# Patient Record
(demographics unavailable — no encounter records)

---

## 2024-10-22 NOTE — HISTORY OF PRESENT ILLNESS
[FreeTextEntry1] :  ID#: [id number]   The patient is a 35 y/o female with no significant PMHx, presenting for hospital follow-up following a 5-day admission to [facility] last week for left-sided facial paralysis. The patient states that last Thursday, 10/17, she began to feel the left side of her face get numb. It became progressively worse, involving the entire left side of the face and mouth, prompting her presentation to the hospital. Ever since discharge, she has begun to experience right-sided blurred vision and tearing of the right eye, with nearly full resolution of the left-sided symptoms. This is the first time anything like this has ever happened, and no on in her family has similar issues. Brain imaging at [facility] was "normal", per the patient. The patient denies LOC, but endorses associated headache. She also denies weakness/numbness of any other part of the body. She notes that right now the only symptom she has is right sided "swelling" sensation and a mild weakness of the left side when opening the mouth. She endorses having the flu a week before the paralysis onset. She was previously healthy with no chronic medical issues and no medications. She is currently on a steroid taper (started at 20mg prednisone), as well as aspirin 81mg and Atorvastatin 40mg, all started with this admission. She denies any h/o head trauma.

## 2024-10-22 NOTE — END OF VISIT
[] : Resident [FreeTextEntry3] : 34-year-old woman presenting with right hemifacial weakness and disturbance of taste suggestive of facial nerve involvement.  She received high-dose prednisone taper with clinical improvement.  Will obtain MRI brain with and without gadolinium with cranial nerve protocol including thin sections through the brainstem.  Obtain labs including CBC comp coags, rheumatologic screening, B12 TSH hepatitis B and C panels, ESR CRP, VZV serology, Lyme, HIV

## 2024-10-22 NOTE — ASSESSMENT
[FreeTextEntry1] : The patient is a 35 y/o F with no significant PMHx who presents for follow-up after hospitalization for left-sided facial paresis and subsequent Bell's palsy diagnosis. The patient endorses near full resolution of symptoms following administration of steroids, making Bell's palsy high on the differential. However, without access to imaging, cannot r/o other acute processes or demyelinating disorders. In a young female with no significant medical history, can also consider MS, given the ocular involvement, however, this is less likely with the autonomic symptoms described.  Plan: - MRI brain w/w/out contrast - IAC and CN protocol w/ thin slices through brain stem - MRA w/ contrast - Autoimmune and Rheumatologic labs - Basic infectious labs - CBC and CMP  Case discussed with Dr. Franz

## 2024-10-22 NOTE — PHYSICAL EXAM
[FreeTextEntry1] :   Mental status: Awake, alert and oriented x3. Naming, repetition and comprehension intact.  Attention/concentration intact.  No dysarthria, no aphasia..   Cranial nerves: Pupils equally round and reactive to light, visual fields full, no nystagmus, extraocular muscles intact, V1 through V3 intact bilaterally and symmetric, hearing intact to finger rub, palate elevation symmetric, tongue was midline. Mild right-sided facial paresis noted, particularly upon smiling.   Motor:  MRC grading 5/5 b/l UE/LE.   strength 5/5.  Normal tone and bulk.  No abnormal movements.   Sensation: Intact to light touch, proprioception, and pinprick.   Coordination: No dysmetria on finger-to-nose and heel-to-shin.  No dysdiadochokinesia.   Reflexes: 2+ in bilateral UE/LE, down-going toes bilaterally. (-) Denny.   Gait: Narrow and steady. No ataxia.  Romberg negative

## 2025-05-29 NOTE — HISTORY OF PRESENT ILLNESS
[FreeTextEntry1] : 35yo , LMP 4/15, hx of bell's palsy, possible bl salpingectomy?, presents to [facility] for annual. Pt reports she has painful periods x4-5 months but does not require OTC analgesics. Denies dysuria, breast pain, abnormal discharge. Pt reports leakage of urine when coughing, jumping. Denies urinary incontinence related to urgency. Reports intercourse w/ 1 male partner of 3 years. Requests STI testing today. Reports she received 2/3 Gardasil doses at another facility approx. 5 years ago and did not return for f/u.  OB Hx: FT c/s x2 (breech) FT  (P2)  Gyn hx: irregular, 2-3 days, light bleeding. denies hx of fibroids, polyps, ovarian cysts. Hx of chlamydia (tx), abnormal pap HPV +, colpo w/ possible LEEP vs cold knife Surg Hx: tubal ligation vs salpingectomy, c/s x2 Med Hx: bell's palsy Meds: (asa 81mg, prescribed for bell's palsy) Fam Hx: DM, HTN Soc Hx: denies tobacco, etoh, recreational drugs. Works as a  Allergies: NKDA

## 2025-05-29 NOTE — PLAN
[FreeTextEntry1] : 35yo , s/p BTL, here for annual.  #HCM -pap and HPV collected -Vaginitis collected -STI panel ordered -Gardasil #3 administered by nursing today  #Urinary incontinence -urogyn referral -Advised weight loss, even 10% decrease in body weight -Avoid bladder irritants (coffee, alcohol) -Advised timed urination   #Dysmennorhea -TVUS ordered  RTC in 2 months